# Patient Record
Sex: FEMALE | Race: WHITE | HISPANIC OR LATINO | Employment: UNEMPLOYED | URBAN - METROPOLITAN AREA
[De-identification: names, ages, dates, MRNs, and addresses within clinical notes are randomized per-mention and may not be internally consistent; named-entity substitution may affect disease eponyms.]

---

## 2017-06-26 ENCOUNTER — NURSE TRIAGE (OUTPATIENT)
Dept: ADMINISTRATIVE | Facility: CLINIC | Age: 22
End: 2017-06-26

## 2017-06-26 PROBLEM — O16.9 ELEVATED BLOOD PRESSURE AFFECTING PREGNANCY, ANTEPARTUM: Status: ACTIVE | Noted: 2017-06-26

## 2017-06-27 NOTE — TELEPHONE ENCOUNTER
Reason for Disposition   [1] Pregnant > 20 weeks AND [2] BP  >= 140/90    Protocols used: ST HIGH BLOOD PRESSURE-A-    Caregiver calling with concerns of pt's blood pressure reading of 165/78 Pulse 90.  Pt is nauseated and having blurred vision.  Advised L&D.

## 2018-09-11 PROBLEM — Z86.2 HISTORY OF ANEMIA: Status: ACTIVE | Noted: 2018-09-11

## 2018-09-11 PROBLEM — E66.3 OVERWEIGHT (BMI 25.0-29.9): Status: ACTIVE | Noted: 2018-09-11

## 2018-09-11 PROBLEM — R06.00 DYSPNEA: Status: ACTIVE | Noted: 2018-09-11

## 2018-09-11 PROBLEM — O16.9 ELEVATED BLOOD PRESSURE AFFECTING PREGNANCY, ANTEPARTUM: Status: RESOLVED | Noted: 2017-06-26 | Resolved: 2018-09-11

## 2018-09-11 PROBLEM — R00.2 PALPITATIONS: Status: ACTIVE | Noted: 2018-09-11

## 2018-09-11 PROBLEM — I95.9 HYPOTENSION: Status: ACTIVE | Noted: 2018-09-11

## 2018-09-24 PROBLEM — E78.2 MIXED HYPERLIPIDEMIA: Status: ACTIVE | Noted: 2018-09-24

## 2018-11-19 PROBLEM — R51.9 CHRONIC INTRACTABLE HEADACHE: Status: ACTIVE | Noted: 2018-11-19

## 2018-11-19 PROBLEM — K21.9 GASTROESOPHAGEAL REFLUX DISEASE WITHOUT ESOPHAGITIS: Status: ACTIVE | Noted: 2018-11-19

## 2018-11-19 PROBLEM — G89.29 CHRONIC INTRACTABLE HEADACHE: Status: ACTIVE | Noted: 2018-11-19

## 2019-11-01 PROBLEM — Z34.90 INTRAUTERINE PREGNANCY: Status: ACTIVE | Noted: 2019-11-01

## 2020-02-26 PROBLEM — O34.211 MATERNAL CARE DUE TO LOW TRANSVERSE UTERINE SCAR FROM PREVIOUS CESAREAN DELIVERY: Status: ACTIVE | Noted: 2020-02-26

## 2021-05-06 ENCOUNTER — PATIENT MESSAGE (OUTPATIENT)
Dept: RESEARCH | Facility: HOSPITAL | Age: 26
End: 2021-05-06

## 2022-12-01 PROBLEM — O34.211 MATERNAL CARE DUE TO LOW TRANSVERSE UTERINE SCAR FROM PREVIOUS CESAREAN DELIVERY: Status: RESOLVED | Noted: 2020-02-26 | Resolved: 2022-12-01

## 2022-12-19 PROBLEM — Z98.891 STATUS POST C-SECTION: Status: ACTIVE | Noted: 2022-12-19

## 2024-01-18 PROBLEM — N93.9 ABNORMAL UTERINE BLEEDING: Status: ACTIVE | Noted: 2024-01-18

## 2024-01-18 PROBLEM — D50.0 IRON DEFICIENCY ANEMIA DUE TO CHRONIC BLOOD LOSS: Status: ACTIVE | Noted: 2024-01-18

## 2024-01-18 PROBLEM — K59.04 CHRONIC IDIOPATHIC CONSTIPATION: Status: ACTIVE | Noted: 2024-01-18

## 2024-01-18 PROBLEM — K42.9 UMBILICAL HERNIA WITHOUT OBSTRUCTION AND WITHOUT GANGRENE: Status: ACTIVE | Noted: 2024-01-18

## 2024-01-18 PROBLEM — M54.9 BACK PAIN: Status: ACTIVE | Noted: 2024-01-18

## 2024-02-21 PROBLEM — R10.2 PELVIC PAIN: Status: ACTIVE | Noted: 2024-02-21

## 2024-02-21 PROBLEM — Z98.890 S/P LAPAROSCOPY: Status: ACTIVE | Noted: 2024-02-21
